# Patient Record
Sex: MALE | Race: WHITE | NOT HISPANIC OR LATINO | Employment: OTHER
[De-identification: names, ages, dates, MRNs, and addresses within clinical notes are randomized per-mention and may not be internally consistent; named-entity substitution may affect disease eponyms.]

---

## 2017-09-05 ENCOUNTER — RECORDS - HEALTHEAST (OUTPATIENT)
Dept: ADMINISTRATIVE | Facility: OTHER | Age: 57
End: 2017-09-05

## 2017-12-04 ENCOUNTER — OFFICE VISIT - HEALTHEAST (OUTPATIENT)
Dept: FAMILY MEDICINE | Facility: CLINIC | Age: 57
End: 2017-12-04

## 2017-12-04 DIAGNOSIS — K76.9 LIVER LESION, LEFT LOBE: ICD-10-CM

## 2017-12-04 DIAGNOSIS — F10.10 ALCOHOL ABUSE: ICD-10-CM

## 2017-12-04 DIAGNOSIS — B34.9 VIRAL SYNDROME: ICD-10-CM

## 2017-12-04 DIAGNOSIS — D69.6 THROMBOCYTOPENIA (H): ICD-10-CM

## 2017-12-11 ENCOUNTER — COMMUNICATION - HEALTHEAST (OUTPATIENT)
Dept: FAMILY MEDICINE | Facility: CLINIC | Age: 57
End: 2017-12-11

## 2017-12-29 ENCOUNTER — HOSPITAL ENCOUNTER (OUTPATIENT)
Dept: ULTRASOUND IMAGING | Facility: HOSPITAL | Age: 57
Discharge: HOME | End: 2017-12-29
Attending: FAMILY MEDICINE

## 2017-12-29 DIAGNOSIS — K76.89 OTHER SPECIFIED DISEASES OF LIVER: ICD-10-CM

## 2017-12-29 DIAGNOSIS — K76.9 LIVER LESION, LEFT LOBE: ICD-10-CM

## 2018-01-03 ENCOUNTER — AMBULATORY - HEALTHEAST (OUTPATIENT)
Dept: FAMILY MEDICINE | Facility: CLINIC | Age: 58
End: 2018-01-03

## 2018-01-03 DIAGNOSIS — K76.9 LIVER LESION, LEFT LOBE: ICD-10-CM

## 2018-01-10 ENCOUNTER — HOSPITAL ENCOUNTER (OUTPATIENT)
Dept: MRI IMAGING | Facility: HOSPITAL | Age: 58
Discharge: HOME | End: 2018-01-10
Attending: FAMILY MEDICINE

## 2018-01-10 ENCOUNTER — AMBULATORY - HEALTHEAST (OUTPATIENT)
Dept: FAMILY MEDICINE | Facility: CLINIC | Age: 58
End: 2018-01-10

## 2018-01-10 DIAGNOSIS — D69.6 THROMBOCYTOPENIA (H): ICD-10-CM

## 2018-01-10 DIAGNOSIS — K76.9 LIVER LESION, LEFT LOBE: ICD-10-CM

## 2018-01-10 DIAGNOSIS — K76.89 OTHER SPECIFIED DISEASES OF LIVER: ICD-10-CM

## 2018-01-10 DIAGNOSIS — K70.30 CIRRHOSIS, ALCOHOLIC (H): ICD-10-CM

## 2018-01-10 DIAGNOSIS — K76.6 PORTAL HYPERTENSION (H): ICD-10-CM

## 2018-03-26 ENCOUNTER — RECORDS - HEALTHEAST (OUTPATIENT)
Dept: ADMINISTRATIVE | Facility: OTHER | Age: 58
End: 2018-03-26

## 2018-04-25 ENCOUNTER — RECORDS - HEALTHEAST (OUTPATIENT)
Dept: ADMINISTRATIVE | Facility: OTHER | Age: 58
End: 2018-04-25

## 2018-04-30 ENCOUNTER — RECORDS - HEALTHEAST (OUTPATIENT)
Dept: ADMINISTRATIVE | Facility: OTHER | Age: 58
End: 2018-04-30

## 2018-06-07 ENCOUNTER — AMBULATORY - HEALTHEAST (OUTPATIENT)
Dept: FAMILY MEDICINE | Facility: CLINIC | Age: 58
End: 2018-06-07

## 2018-10-31 ENCOUNTER — COMMUNICATION - HEALTHEAST (OUTPATIENT)
Dept: INTERNAL MEDICINE | Facility: CLINIC | Age: 58
End: 2018-10-31

## 2018-12-05 ENCOUNTER — OFFICE VISIT - HEALTHEAST (OUTPATIENT)
Dept: FAMILY MEDICINE | Facility: CLINIC | Age: 58
End: 2018-12-05

## 2018-12-05 ENCOUNTER — COMMUNICATION - HEALTHEAST (OUTPATIENT)
Dept: FAMILY MEDICINE | Facility: CLINIC | Age: 58
End: 2018-12-05

## 2018-12-05 DIAGNOSIS — S83.207D ACUTE MENISCAL TEAR OF LEFT KNEE, SUBSEQUENT ENCOUNTER: ICD-10-CM

## 2018-12-05 DIAGNOSIS — K76.6 PORTAL HYPERTENSION (H): ICD-10-CM

## 2018-12-05 DIAGNOSIS — K70.30 ALCOHOLIC CIRRHOSIS, UNSPECIFIED WHETHER ASCITES PRESENT (H): ICD-10-CM

## 2018-12-05 DIAGNOSIS — D69.6 THROMBOCYTOPENIA (H): ICD-10-CM

## 2018-12-05 DIAGNOSIS — K76.9 LIVER LESION, LEFT LOBE: ICD-10-CM

## 2018-12-05 DIAGNOSIS — Z01.818 PREOP GENERAL PHYSICAL EXAM: ICD-10-CM

## 2018-12-05 LAB
ALBUMIN SERPL-MCNC: 3.5 G/DL (ref 3.5–5)
ALP SERPL-CCNC: 137 U/L (ref 45–120)
ALT SERPL W P-5'-P-CCNC: 53 U/L (ref 0–45)
AST SERPL W P-5'-P-CCNC: 76 U/L (ref 0–40)
BILIRUB DIRECT SERPL-MCNC: 0.9 MG/DL
BILIRUB SERPL-MCNC: 2.1 MG/DL (ref 0–1)
ERYTHROCYTE [DISTWIDTH] IN BLOOD BY AUTOMATED COUNT: 11.6 % (ref 11–14.5)
HCT VFR BLD AUTO: 47.5 % (ref 40–54)
HGB BLD-MCNC: 15.8 G/DL (ref 14–18)
INR PPP: 1.16 (ref 0.9–1.1)
MCH RBC QN AUTO: 34 PG (ref 27–34)
MCHC RBC AUTO-ENTMCNC: 33.2 G/DL (ref 32–36)
MCV RBC AUTO: 102 FL (ref 80–100)
PLATELET # BLD AUTO: 58 THOU/UL (ref 140–440)
PMV BLD AUTO: 9.5 FL (ref 7–10)
PROT SERPL-MCNC: 6.9 G/DL (ref 6–8)
RBC # BLD AUTO: 4.64 MILL/UL (ref 4.4–6.2)
WBC: 3.4 THOU/UL (ref 4–11)

## 2018-12-05 ASSESSMENT — MIFFLIN-ST. JEOR: SCORE: 1916.01

## 2018-12-06 LAB
ATRIAL RATE - MUSE: 88 BPM
DIASTOLIC BLOOD PRESSURE - MUSE: NORMAL MMHG
INTERPRETATION ECG - MUSE: NORMAL
P AXIS - MUSE: 54 DEGREES
PR INTERVAL - MUSE: 154 MS
QRS DURATION - MUSE: 80 MS
QT - MUSE: 370 MS
QTC - MUSE: 447 MS
R AXIS - MUSE: 59 DEGREES
SYSTOLIC BLOOD PRESSURE - MUSE: NORMAL MMHG
T AXIS - MUSE: 35 DEGREES
VENTRICULAR RATE- MUSE: 88 BPM

## 2018-12-07 ENCOUNTER — AMBULATORY - HEALTHEAST (OUTPATIENT)
Dept: LAB | Facility: CLINIC | Age: 58
End: 2018-12-07

## 2018-12-07 DIAGNOSIS — Z00.00 HEALTHCARE MAINTENANCE: ICD-10-CM

## 2018-12-07 LAB — PLATELET # BLD AUTO: 73 THOU/UL (ref 140–440)

## 2019-01-08 ENCOUNTER — RECORDS - HEALTHEAST (OUTPATIENT)
Dept: ADMINISTRATIVE | Facility: OTHER | Age: 59
End: 2019-01-08

## 2019-01-13 ENCOUNTER — RECORDS - HEALTHEAST (OUTPATIENT)
Dept: ADMINISTRATIVE | Facility: OTHER | Age: 59
End: 2019-01-13

## 2019-02-12 ENCOUNTER — RECORDS - HEALTHEAST (OUTPATIENT)
Dept: ADMINISTRATIVE | Facility: OTHER | Age: 59
End: 2019-02-12

## 2019-02-21 ENCOUNTER — COMMUNICATION - HEALTHEAST (OUTPATIENT)
Dept: SCHEDULING | Facility: CLINIC | Age: 59
End: 2019-02-21

## 2019-12-03 ENCOUNTER — RECORDS - HEALTHEAST (OUTPATIENT)
Dept: ADMINISTRATIVE | Facility: OTHER | Age: 59
End: 2019-12-03

## 2021-01-01 ENCOUNTER — HOSPITAL ENCOUNTER (EMERGENCY)
Facility: HOSPITAL | Age: 61
Discharge: ANOTHER HOSPITAL | End: 2021-08-23
Attending: EMERGENCY MEDICINE | Admitting: EMERGENCY MEDICINE

## 2021-01-01 ENCOUNTER — APPOINTMENT (OUTPATIENT)
Dept: RADIOLOGY | Facility: HOSPITAL | Age: 61
End: 2021-01-01
Attending: EMERGENCY MEDICINE

## 2021-01-01 ENCOUNTER — APPOINTMENT (OUTPATIENT)
Dept: CT IMAGING | Facility: HOSPITAL | Age: 61
End: 2021-01-01
Attending: EMERGENCY MEDICINE

## 2021-01-01 VITALS
RESPIRATION RATE: 26 BRPM | SYSTOLIC BLOOD PRESSURE: 107 MMHG | BODY MASS INDEX: 34.38 KG/M2 | OXYGEN SATURATION: 93 % | WEIGHT: 250 LBS | HEART RATE: 118 BPM | TEMPERATURE: 97.8 F | DIASTOLIC BLOOD PRESSURE: 55 MMHG

## 2021-01-01 VITALS — HEIGHT: 72 IN | BODY MASS INDEX: 31.83 KG/M2 | WEIGHT: 235 LBS

## 2021-01-01 VITALS — WEIGHT: 247 LBS | BODY MASS INDEX: 34.94 KG/M2

## 2021-01-01 DIAGNOSIS — R65.21 SEPTIC SHOCK (H): ICD-10-CM

## 2021-01-01 DIAGNOSIS — K70.31 ALCOHOLIC CIRRHOSIS OF LIVER WITH ASCITES (H): ICD-10-CM

## 2021-01-01 DIAGNOSIS — N28.9 RENAL INSUFFICIENCY: ICD-10-CM

## 2021-01-01 DIAGNOSIS — K72.10 END-STAGE LIVER DISEASE (H): ICD-10-CM

## 2021-01-01 DIAGNOSIS — E87.1 HYPONATREMIA: ICD-10-CM

## 2021-01-01 DIAGNOSIS — A41.9 SEPTIC SHOCK (H): ICD-10-CM

## 2021-01-01 LAB
% LINING CELLS, BODY FLUID: 4 %
ABO/RH(D): NORMAL
ABO/RH(D): NORMAL
ALBUMIN FLD-MCNC: <0.6 G/DL
ALBUMIN SERPL-MCNC: 1.6 G/DL (ref 3.5–5)
ALBUMIN SERPL-MCNC: 1.7 G/DL (ref 3.5–5)
ALP SERPL-CCNC: 96 U/L (ref 45–120)
ALT SERPL W P-5'-P-CCNC: 72 U/L (ref 0–45)
AMMONIA PLAS-SCNC: 45 UMOL/L (ref 11–35)
ANION GAP SERPL CALCULATED.3IONS-SCNC: 12 MMOL/L (ref 5–18)
ANTIBODY SCREEN: NEGATIVE
APPEARANCE FLD: CLEAR
APTT PPP: 42 SECONDS (ref 22–38)
AST SERPL W P-5'-P-CCNC: 118 U/L (ref 0–40)
ATRIAL RATE - MUSE: 116 BPM
BACTERIA BLD CULT: NO GROWTH
BACTERIA BLD CULT: NO GROWTH
BACTERIA FLD CULT: NO GROWTH
BACTERIA FLD CULT: NORMAL
BASOPHILS # BLD AUTO: 0 10E3/UL (ref 0–0.2)
BASOPHILS NFR BLD AUTO: 0 %
BILIRUB DIRECT SERPL-MCNC: 5.3 MG/DL
BILIRUB SERPL-MCNC: 11.9 MG/DL (ref 0–1)
BUN SERPL-MCNC: 30 MG/DL (ref 8–22)
CALCIUM SERPL-MCNC: 7.9 MG/DL (ref 8.5–10.5)
CHLORIDE BLD-SCNC: 92 MMOL/L (ref 98–107)
CO2 SERPL-SCNC: 19 MMOL/L (ref 22–31)
COLOR FLD: YELLOW
CREAT SERPL-MCNC: 1.54 MG/DL (ref 0.7–1.3)
DIASTOLIC BLOOD PRESSURE - MUSE: 44 MMHG
EOSINOPHIL # BLD AUTO: 0 10E3/UL (ref 0–0.7)
EOSINOPHIL NFR BLD AUTO: 0 %
ERYTHROCYTE [DISTWIDTH] IN BLOOD BY AUTOMATED COUNT: 14.5 % (ref 10–15)
ETHANOL SERPL-MCNC: <10 MG/DL
GFR SERPL CREATININE-BSD FRML MDRD: 49 ML/MIN/1.73M2
GLUCOSE BLD-MCNC: 117 MG/DL (ref 70–125)
GRAM STAIN RESULT: NORMAL
GRAM STAIN RESULT: NORMAL
HCT VFR BLD AUTO: 41.1 % (ref 40–53)
HGB BLD-MCNC: 14.6 G/DL (ref 13.3–17.7)
IMM GRANULOCYTES # BLD: 0.1 10E3/UL
IMM GRANULOCYTES NFR BLD: 1 %
INR PPP: 3.26 (ref 0.9–1.15)
INTERPRETATION ECG - MUSE: NORMAL
LACTATE SERPL-SCNC: 3.4 MMOL/L (ref 0.7–2)
LACTATE SERPL-SCNC: 5.1 MMOL/L (ref 0.7–2)
LDH FLD L TO P-CCNC: 71 U/L
LIPASE SERPL-CCNC: 25 U/L (ref 0–52)
LYMPHOCYTES # BLD AUTO: 1 10E3/UL (ref 0.8–5.3)
LYMPHOCYTES NFR BLD AUTO: 10 %
LYMPHOCYTES NFR FLD MANUAL: 12 %
MCH RBC QN AUTO: 37.6 PG (ref 26.5–33)
MCHC RBC AUTO-ENTMCNC: 35.5 G/DL (ref 31.5–36.5)
MCV RBC AUTO: 106 FL (ref 78–100)
MONOCYTES # BLD AUTO: 1.5 10E3/UL (ref 0–1.3)
MONOCYTES NFR BLD AUTO: 15 %
MONOS+MACROS NFR FLD MANUAL: 75 %
NEUTROPHILS # BLD AUTO: 7.1 10E3/UL (ref 1.6–8.3)
NEUTROPHILS NFR BLD AUTO: 74 %
NEUTS BAND NFR FLD MANUAL: 9 %
NRBC # BLD AUTO: 0 10E3/UL
NRBC BLD AUTO-RTO: 0 /100
P AXIS - MUSE: 20 DEGREES
PATH REPORT.COMMENTS IMP SPEC: NORMAL
PATH REPORT.FINAL DX SPEC: NORMAL
PATH REPORT.GROSS SPEC: NORMAL
PATH REPORT.MICROSCOPIC SPEC OTHER STN: NORMAL
PATH REPORT.RELEVANT HX SPEC: NORMAL
PLATELET # BLD AUTO: 104 10E3/UL (ref 150–450)
POTASSIUM BLD-SCNC: 4.2 MMOL/L (ref 3.5–5)
PR INTERVAL - MUSE: 140 MS
PROT FLD-MCNC: 0.9 G/DL
PROT SERPL-MCNC: 6 G/DL (ref 6–8)
QRS DURATION - MUSE: 82 MS
QT - MUSE: 350 MS
QTC - MUSE: 486 MS
R AXIS - MUSE: 25 DEGREES
RBC # BLD AUTO: 3.88 10E6/UL (ref 4.4–5.9)
RBC # FLD: 1000 /UL
SARS-COV-2 RNA RESP QL NAA+PROBE: NEGATIVE
SODIUM SERPL-SCNC: 123 MMOL/L (ref 136–145)
SPECIMEN EXPIRATION DATE: NORMAL
SPECIMEN EXPIRATION DATE: NORMAL
SYSTOLIC BLOOD PRESSURE - MUSE: 69 MMHG
T AXIS - MUSE: 34 DEGREES
TROPONIN I SERPL-MCNC: 0.04 NG/ML (ref 0–0.29)
VENTRICULAR RATE- MUSE: 116 BPM
WBC # BLD AUTO: 9.7 10E3/UL (ref 4–11)
WBC # FLD AUTO: 205 /UL

## 2021-01-01 PROCEDURE — 83690 ASSAY OF LIPASE: CPT | Performed by: EMERGENCY MEDICINE

## 2021-01-01 PROCEDURE — 250N000011 HC RX IP 250 OP 636: Performed by: EMERGENCY MEDICINE

## 2021-01-01 PROCEDURE — 83605 ASSAY OF LACTIC ACID: CPT | Performed by: EMERGENCY MEDICINE

## 2021-01-01 PROCEDURE — 84484 ASSAY OF TROPONIN QUANT: CPT | Performed by: EMERGENCY MEDICINE

## 2021-01-01 PROCEDURE — 80048 BASIC METABOLIC PNL TOTAL CA: CPT | Performed by: EMERGENCY MEDICINE

## 2021-01-01 PROCEDURE — 89050 BODY FLUID CELL COUNT: CPT | Performed by: EMERGENCY MEDICINE

## 2021-01-01 PROCEDURE — 250N000009 HC RX 250: Performed by: EMERGENCY MEDICINE

## 2021-01-01 PROCEDURE — 87205 SMEAR GRAM STAIN: CPT | Performed by: EMERGENCY MEDICINE

## 2021-01-01 PROCEDURE — 99291 CRITICAL CARE FIRST HOUR: CPT | Mod: 25

## 2021-01-01 PROCEDURE — 82140 ASSAY OF AMMONIA: CPT | Performed by: EMERGENCY MEDICINE

## 2021-01-01 PROCEDURE — 82077 ASSAY SPEC XCP UR&BREATH IA: CPT | Performed by: EMERGENCY MEDICINE

## 2021-01-01 PROCEDURE — 96375 TX/PRO/DX INJ NEW DRUG ADDON: CPT

## 2021-01-01 PROCEDURE — 87635 SARS-COV-2 COVID-19 AMP PRB: CPT | Performed by: EMERGENCY MEDICINE

## 2021-01-01 PROCEDURE — 87075 CULTR BACTERIA EXCEPT BLOOD: CPT | Performed by: EMERGENCY MEDICINE

## 2021-01-01 PROCEDURE — C9113 INJ PANTOPRAZOLE SODIUM, VIA: HCPCS | Performed by: EMERGENCY MEDICINE

## 2021-01-01 PROCEDURE — 96368 THER/DIAG CONCURRENT INF: CPT

## 2021-01-01 PROCEDURE — 96365 THER/PROPH/DIAG IV INF INIT: CPT | Mod: 59

## 2021-01-01 PROCEDURE — 82042 OTHER SOURCE ALBUMIN QUAN EA: CPT | Performed by: EMERGENCY MEDICINE

## 2021-01-01 PROCEDURE — 93005 ELECTROCARDIOGRAM TRACING: CPT

## 2021-01-01 PROCEDURE — 71045 X-RAY EXAM CHEST 1 VIEW: CPT

## 2021-01-01 PROCEDURE — 258N000003 HC RX IP 258 OP 636: Performed by: EMERGENCY MEDICINE

## 2021-01-01 PROCEDURE — 82248 BILIRUBIN DIRECT: CPT | Performed by: EMERGENCY MEDICINE

## 2021-01-01 PROCEDURE — 36415 COLL VENOUS BLD VENIPUNCTURE: CPT | Performed by: EMERGENCY MEDICINE

## 2021-01-01 PROCEDURE — 93005 ELECTROCARDIOGRAM TRACING: CPT | Performed by: EMERGENCY MEDICINE

## 2021-01-01 PROCEDURE — 87040 BLOOD CULTURE FOR BACTERIA: CPT | Performed by: EMERGENCY MEDICINE

## 2021-01-01 PROCEDURE — 87070 CULTURE OTHR SPECIMN AEROBIC: CPT | Performed by: EMERGENCY MEDICINE

## 2021-01-01 PROCEDURE — 83615 LACTATE (LD) (LDH) ENZYME: CPT | Performed by: EMERGENCY MEDICINE

## 2021-01-01 PROCEDURE — 88112 CYTOPATH CELL ENHANCE TECH: CPT | Mod: TC | Performed by: EMERGENCY MEDICINE

## 2021-01-01 PROCEDURE — 85610 PROTHROMBIN TIME: CPT | Performed by: EMERGENCY MEDICINE

## 2021-01-01 PROCEDURE — 86901 BLOOD TYPING SEROLOGIC RH(D): CPT | Performed by: EMERGENCY MEDICINE

## 2021-01-01 PROCEDURE — C9803 HOPD COVID-19 SPEC COLLECT: HCPCS

## 2021-01-01 PROCEDURE — 88112 CYTOPATH CELL ENHANCE TECH: CPT | Mod: 26 | Performed by: PATHOLOGY

## 2021-01-01 PROCEDURE — 85025 COMPLETE CBC W/AUTO DIFF WBC: CPT | Performed by: EMERGENCY MEDICINE

## 2021-01-01 PROCEDURE — 84157 ASSAY OF PROTEIN OTHER: CPT | Performed by: EMERGENCY MEDICINE

## 2021-01-01 PROCEDURE — 86900 BLOOD TYPING SEROLOGIC ABO: CPT | Performed by: EMERGENCY MEDICINE

## 2021-01-01 PROCEDURE — 96366 THER/PROPH/DIAG IV INF ADDON: CPT

## 2021-01-01 PROCEDURE — 74177 CT ABD & PELVIS W/CONTRAST: CPT

## 2021-01-01 PROCEDURE — 89051 BODY FLUID CELL COUNT: CPT | Performed by: EMERGENCY MEDICINE

## 2021-01-01 PROCEDURE — P9047 ALBUMIN (HUMAN), 25%, 50ML: HCPCS | Performed by: EMERGENCY MEDICINE

## 2021-01-01 PROCEDURE — 85730 THROMBOPLASTIN TIME PARTIAL: CPT | Performed by: EMERGENCY MEDICINE

## 2021-01-01 PROCEDURE — 36569 INSJ PICC 5 YR+ W/O IMAGING: CPT

## 2021-01-01 PROCEDURE — 96361 HYDRATE IV INFUSION ADD-ON: CPT

## 2021-01-01 RX ORDER — IOPAMIDOL 755 MG/ML
75 INJECTION, SOLUTION INTRAVASCULAR ONCE
Status: COMPLETED | OUTPATIENT
Start: 2021-01-01 | End: 2021-01-01

## 2021-01-01 RX ORDER — CEFTRIAXONE 1 G/1
1 INJECTION, POWDER, FOR SOLUTION INTRAMUSCULAR; INTRAVENOUS ONCE
Status: COMPLETED | OUTPATIENT
Start: 2021-01-01 | End: 2021-01-01

## 2021-01-01 RX ORDER — NOREPINEPHRINE BITARTRATE 0.02 MG/ML
.01-.6 INJECTION, SOLUTION INTRAVENOUS CONTINUOUS
Status: DISCONTINUED | OUTPATIENT
Start: 2021-01-01 | End: 2021-01-01 | Stop reason: HOSPADM

## 2021-01-01 RX ORDER — CEFTRIAXONE 1 G/1
1 INJECTION, POWDER, FOR SOLUTION INTRAMUSCULAR; INTRAVENOUS ONCE
Status: DISCONTINUED | OUTPATIENT
Start: 2021-01-01 | End: 2021-01-01

## 2021-01-01 RX ORDER — ALBUMIN (HUMAN) 12.5 G/50ML
12.5 SOLUTION INTRAVENOUS ONCE
Status: COMPLETED | OUTPATIENT
Start: 2021-01-01 | End: 2021-01-01

## 2021-01-01 RX ADMIN — PANTOPRAZOLE SODIUM 80 MG: 40 INJECTION, POWDER, FOR SOLUTION INTRAVENOUS at 18:13

## 2021-01-01 RX ADMIN — IOPAMIDOL 75 ML: 755 INJECTION, SOLUTION INTRAVENOUS at 19:16

## 2021-01-01 RX ADMIN — ALBUMIN HUMAN 12.5 G: 0.25 SOLUTION INTRAVENOUS at 18:13

## 2021-01-01 RX ADMIN — Medication 0.03 MCG/KG/MIN: at 17:07

## 2021-01-01 RX ADMIN — SODIUM CHLORIDE 250 ML: 9 INJECTION, SOLUTION INTRAVENOUS at 16:03

## 2021-01-01 RX ADMIN — CEFTRIAXONE SODIUM 1 G: 1 INJECTION, POWDER, FOR SOLUTION INTRAMUSCULAR; INTRAVENOUS at 19:23

## 2021-06-14 NOTE — PROGRESS NOTES
Subjective: This patient comes in for evaluation is a 57-year-old male patient comes in for symptoms of muscle aches headache nausea chills he has been out of work since 11/29/2017 symptoms start 11/28/2017.    She had some clear drainage cough    He started to feel better.    Patient previously had abnormal left lobe liver lesion 1.6 x 1.1 cm this was to get a follow-up ultrasound.  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  I did recheck CBC as he had thrombocytopenia.  Will get back to him regarding that and the ultrasound of the liver.    He had been seeing hematology.    He has not seen any doctor since I saw him last back in summer 2016    He states he is drinking alcohol less now down to 12 cans per week of beer        Tobacco status: He  reports that he quit smoking about 4 years ago. His smoking use included Cigars. He has never used smokeless tobacco.    Patient Active Problem List    Diagnosis Date Noted     Liver lesion, right lobe 04/27/2016     Alcoholic liver disease 04/18/2016     Acute meniscal tear of right knee 03/25/2016     Thrombocytopenia 03/25/2016     Anterior Wall Chest Pain With Respiration      Contusion Of The Right Chest Wall With Intact Skin Surface        No current outpatient prescriptions on file.     No current facility-administered medications for this visit.        ROS: 10 point review of systems negative other than as outlined above    Objective:    /76 (Patient Site: Right Arm, Patient Position: Sitting, Cuff Size: Adult Large)  Pulse 96  Temp 97.3  F (36.3  C) (Oral)   Resp 16  Wt (!) 247 lb (112 kg)  BMI 34.94 kg/m2  Body mass index is 34.94 kg/(m^2).      General appearance no acute distress    Vital signs are stable afebrile nose was clear canals and TMs normal oropharynx was clear    Lungs clear no rales or rhonchi heart regular S1-S2    Abdomen soft  nontender no masses no organomegaly    No scleral icterus, no jaundice change to the skin.    MCV was elevated at 102 hemoglobin normal 15.6 platelets are low at 60,000 normal white count at 5100 with normal differential    Ultrasound liver pending    Results for orders placed or performed in visit on 12/04/17   HM1 (CBC with Diff)   Result Value Ref Range    WBC 5.1 4.0 - 11.0 thou/uL    RBC 4.57 4.40 - 6.20 mill/uL    Hemoglobin 15.6 14.0 - 18.0 g/dL    Hematocrit 46.7 40.0 - 54.0 %     (H) 80 - 100 fL    MCH 34.1 (H) 27.0 - 34.0 pg    MCHC 33.5 32.0 - 36.0 g/dL    RDW 9.8 (L) 11.0 - 14.5 %    Platelets 60 (L) 140 - 440 thou/uL    MPV 8.3 7.0 - 10.0 fL    Neutrophils % 45 (L) 50 - 70 %    Lymphocytes % 38 20 - 40 %    Monocytes % 15 (H) 2 - 10 %    Eosinophils % 1 0 - 6 %    Basophils % 1 0 - 2 %    Neutrophils Absolute 2.3 2.0 - 7.7 thou/uL    Lymphocytes Absolute 1.9 0.8 - 4.4 thou/uL    Monocytes Absolute 0.8 0.0 - 0.9 thou/uL    Eosinophils Absolute 0.1 0.0 - 0.4 thou/uL    Basophils Absolute 0.1 0.0 - 0.2 thou/uL       Assessment:  1. Viral syndrome  HM1(CBC and Differential)    HM1 (CBC with Diff)   2. Thrombocytopenia  HM1(CBC and Differential)    HM1 (CBC with Diff)   3. Alcohol abuse     4. Liver lesion, left lobe  US Abdomen Limited     Viral syndrome improving okay to return to work tomorrow    Problem cytopenia worsened some with platelets at 60,000 will discuss seeing hematology again    Liver lesion will await ultrasound    History of alcohol abuse although drinking less alcohol now by history    Plan: As outlined above, symptomatic treatment for viral syndrome    This transcription uses voice recognition software, which may contain typographical errors.

## 2021-06-15 NOTE — PROGRESS NOTES
Patient's MRI of his liver came back showing evidence of cirrhosis and portal hypertension.  There are 2 lesions 1.4 cm and 0.7 cm in the lateral left hepatic lobe.  These represent an LR-3 observation meaning intermediate probability for hepatocellular carcinoma.    I contacted the patient regarding results.  He also has the thrombocytopenia as outlined previous    He has decreased his alcohol but still drinks about a 12 pack per week he admitted when I saw him in December    I have not seen him for a year and a half prior to that.    I am referring the patient to [facility] for further evaluation and treatment regarding this question need for liver biopsy    Patient understands that and understands again the recommendation to quit alcohol altogether.    We will contact him at his work phone at [phone] tomorrow

## 2021-06-22 NOTE — PROGRESS NOTES
Preoperative Exam    [id number] Procedure: Left knee scope  Surgery Date:  12/07/2018  Surgery Location: [facility], fax [phone]    Surgeon:  Dr. Lovell    Assessment/Plan:     1. Preop general physical exam  Electrocardiogram Perform - Clinic    HM2(CBC w/o Differential)    INR   2. Portal hypertension (H)  Ambulatory referral to Gastroenterology   3. Alcoholic cirrhosis, unspecified whether ascites present (H)  Ambulatory referral to Gastroenterology    Hepatic Profile   4. Liver lesion, left lobe  Ambulatory referral to Gastroenterology   5. Acute meniscal tear of left knee, subsequent encounter     6. Thrombocytopenia (H)  HM2(CBC w/o Differential)         Surgical Procedure Risk: Low (reported cardiac risk generally < 1%)  Have you had prior anesthesia?: No  Have you or any family members had a previous anesthesia reaction:  No  Do you or any family members have a history of a clotting or bleeding disorder?: No  Cardiac Risk Assessment: no increased risk for major cardiac complications    Patient approved for surgery with general or local anesthesia.        Functional Status: Independent  Patient plans to recover at home with family.     Subjective:      Wally Eagle is a 58 y.o. male who presents for a preoperative consultation.  History of left knee pain with effusion has fraying of meniscus.  I do not have any imaging or orthopedic notes.  Only the history from the patient    Patient has a history of alcoholic cirrhosis with portal hypertension and low platelets.  He is followed by Gurdeep GARCIA    He also saw a hematologist regarding his low platelets prior to his surgery for right knee meniscus arthroscopy.  He had platelets at 60,000 at that time and was authorized for surgery.    His platelets today were 58,000.  He has had no bleeding history.    He does need to follow-up again with Gurdeep GARCIA to follow-up on some liver densities.  Will need follow-up labs  "and imaging.  He was referred for this.  He understands the importance of getting this follow-up done.  He failed to follow-up last spring and did not get the additional lab tests that they wanted to get in March 2018.    10 point review of systems reviewed and are negative, other than those listed in the HPI.    Pertinent History  Do you have difficulty breathing or chest pain after walking up a flight of stairs: No  History of obstructive sleep apnea: No  Steroid use in the last 6 months: No  Frequent Aspirin/NSAID use: Yes: Uses ibuprofen PRN, but \"not very often\"  Prior Blood Transfusion: No  Prior Blood Transfusion Reaction: No  If for some reason prior to, during or after the procedure, if it is medically indicated, would you be willing to have a blood transfusion?:  There is no transfusion refusal.    Current Outpatient Medications   Medication Sig Dispense Refill     ibuprofen (ADVIL,MOTRIN) 600 MG tablet Take 1 tablet (600 mg total) by mouth every 6 (six) hours as needed for pain. 30 tablet 0     No current facility-administered medications for this visit.         No Known Allergies    Patient Active Problem List   Diagnosis     Contusion Of The Right Chest Wall With Intact Skin Surface     Anterior Wall Chest Pain With Respiration     Acute meniscal tear of right knee     Thrombocytopenia (H)     Alcoholic liver disease (H)     Liver lesion, right lobe     Portal hypertension (H)     Cirrhosis, alcoholic (H)     Liver lesion, left lobe       Past Medical History:   Diagnosis Date     Alcoholic liver disease (H)      Portal hypertension (H)      Thrombocytopenia (H)        Past Surgical History:   Procedure Laterality Date     APPENDECTOMY  1970   Right knee meniscal tear, arthroscopy, 2016    No family or personal history of any bleeding or anesthesia problems.    Social History     Socioeconomic History     Marital status: Single     Spouse name: Not on file     Number of children: 1     Years of " "education: Not on file     Highest education level: Not on file   Social Needs     Financial resource strain: Not on file     Food insecurity - worry: Not on file     Food insecurity - inability: Not on file     Transportation needs - medical: Not on file     Transportation needs - non-medical: Not on file   Occupational History     Occupation:    Tobacco Use     Smoking status: Former Smoker     Types: Cigars     Last attempt to quit: 3/25/2013     Years since quittin.7     Smokeless tobacco: Never Used     Tobacco comment: has an occasional cigarrette   Substance and Sexual Activity     Alcohol use: Yes     Alcohol/week: 14.4 oz     Types: 24 Cans of beer per week     Drug use: No     Sexual activity: No   Other Topics Concern     Not on file   Social History Narrative    Single;     Has one daughter, grandson             Objective:     Vitals:    18 0824   BP: 92/66   Pulse: (!) 104   Resp: 16   Temp: 97.9  F (36.6  C)   TempSrc: Oral   SpO2: 96%   Weight: (!) 235 lb (106.6 kg)   Height: 5' 11.5\" (1.816 m)         Physical Exam:  General appearance no acute distress    HEENT oropharynx is clear nose is clear canals and TMs normal    Lungs are clear throughout no rales or rhonchi heart regular S1-S2    Abdomen is soft nontender no guarding or rebound no ascites or abdominal masses.    Extremities without edema skin was normal.    Left knee with effusion tenderness both medial and lateral joint line.    Skin was normal no rashes  There are no Patient Instructions on file for this visit.      Labs: Liver function tests are pending, INR is pending.  These will be faxed.    Platelets at 58,000 and are stable from last check.,  60,000 back in 2016.  Recent Results (from the past 24 hour(s))   HM2(CBC w/o Differential)    Collection Time: 18  9:02 AM   Result Value Ref Range    WBC 3.4 (L) 4.0 - 11.0 thou/uL    RBC 4.64 4.40 - 6.20 mill/uL    Hemoglobin 15.8 14.0 - 18.0 g/dL    Hematocrit 47.5 " 40.0 - 54.0 %     (H) 80 - 100 fL    MCH 34.0 27.0 - 34.0 pg    MCHC 33.2 32.0 - 36.0 g/dL    RDW 11.6 11.0 - 14.5 %    Platelets 58 (L) 140 - 440 thou/uL    MPV 9.5 7.0 - 10.0 fL   Electrocardiogram Perform - Clinic    Collection Time: 12/05/18  9:35 AM   Result Value Ref Range    SYSTOLIC BLOOD PRESSURE  mmHg    DIASTOLIC BLOOD PRESSURE  mmHg    VENTRICULAR RATE 88 BPM    ATRIAL RATE 88 BPM    P-R INTERVAL 154 ms    QRS DURATION 80 ms    Q-T INTERVAL 370 ms    QTC CALCULATION (BEZET) 447 ms    P Axis 54 degrees    R AXIS 59 degrees    T AXIS 35 degrees    MUSE DIAGNOSIS       Normal sinus rhythm  Normal ECG  When compared with ECG of 10-MAR-2013 15:49,  No significant change was found           There is no immunization history on file for this patient.        Electronically signed by Eric Villatoro MD 12/05/18 8:28 AM

## 2021-08-22 PROBLEM — K70.30 CIRRHOSIS, ALCOHOLIC (H): Status: ACTIVE | Noted: 2018-01-10

## 2021-08-22 PROBLEM — K76.6 PORTAL HYPERTENSION (H): Status: ACTIVE | Noted: 2018-01-10

## 2021-08-22 NOTE — ED PROVIDER NOTES
"  Emergency Department Encounter     Evaluation Date & Time:   8/22/2021  3:41 PM    CHIEF COMPLAINT:  Hypotension      Triage Note: Pt with history of cirrhosis.  Pt with large distended abdomen.  Pt with increased n/v/d.  Pt a/o x3 but per neighbor pt with increased failure to thrive.  Pt SBP in the 60's. Md called to room for evaluation        Impression and Plan       FINAL IMPRESSION:    ICD-10-CM    1. Septic shock (H)  A41.9     R65.21    2. End-stage liver disease (H)  K72.90    3. Alcoholic cirrhosis of liver with ascites (H)  K70.31    4. Hyponatremia  E87.1    5. Renal insufficiency  N28.9          ED COURSE & MEDICAL DECISION MAKING:    3:46PM I met with the patient for the initial interview and physical examination. Discussed plan for treatment and workup in the ED. PPE: Provider wore gloves, goggles, surgical cap, N95 mask, and surgical mask.   4:14 PM I discussed the case with Dr. Bruce from [facility].   4:30 PM I spoke with Dr. Mccarty, intensivist for recommendations.  5:41PM I attempted paracentesis but was unsuccessful. Discussed with a colleague and she will try.   6:15 PM Dr. Wolfe attempted paracentesis.   8:38 PM I spoke with Dr. Lilly, intensivist at [facility] who agrees to admit the patient.     61 year old male, history of alcoholic cirrhosis with ascites and portal hypertension, who presents from home for evaluation of hypotension. His neighbor called medics for concerns that he was not caring for himself. On their arrival, medics recorded BP of 58/40, gave 200 mLs fluid, and SBP improved to 60s.     Patient reports abdominal discomfort for the past couple of weeks, which he describes as feeling \"like I ate too much\" with associated abdominal distention. He reports nausea with dry heaves as well as diarrhea. No hematemesis or melena, but he is unsure if he has had hematochezia. No fevers. Reports some shortness of breath; no chest pain or cough. Last used alcohol one month ago.    On exam, " patient is ill-appearing and ashen with BL scleral icterus. Abdomen is very distended, but soft and non-tender to palpation.    Patient placed on cardiac monitor, IV access established and blood sent for labs.    Patient's initial blood pressure was in the 60s systolic for which he was given 250 cc IVF bolus.    PICC line ordered for administration of vasopressors; levophed started in peripheral IV (16 gauge) pending placement of PICC.      I spoke with GI and they recommended pan-culture, including ascitic fluid and antibiotics (IV Ceftriaxone), as well as cytology on the ascitic fluid. They also recommended ICU admission.    I spoke with Dr. Rodríguez, Intensivist, who agreed with administration of small dose of IV albumin, pressors with goal SBP >90 and ICU admission.    PICC line placed and after confirmation of placement, levophed switched from peripheral IV to PICC line.    I was unsuccessful at obtaining ascitic fluid under ultrasound-guidance, possibly secondary to IV being too short. Dr. Wolfe was able to obtain 20cc ascitic fluid and studies, including cytology, were ordered. Patient then given IV Ceftriaxone.    Labs remarkable for no leukocytosis (WBC 9.7) or anemia (Hb 14.4) with thrombocytopenia (plt 104). He has hyponatremia (Na+ 123) - likely secondary to hypervolemia.  He has renal insufficiency (creatinine 1.54 with GFR 49 - baseline renal function unknown) - consider hepatorenal syndrome.  Hepatic panel remarkable for elevated bilirubin (total 11.9, direct 5.3) with elevated enzymes (, ALT 72).  He is coagulopathic with INR of 3.26.    Lactate returned elevated to 5.1; consider combination of sepsis, dehydration and poor hepatic clearance.  Blood cultures, urine culture and ascitic fluid culture obtained and patient given IV ceftriaxone.    Given severe liver disease with third spacing and pleural effusions with concern for pulmonary edema, patient was not given 30 ml/kg IVF bolus.    CT  "abdomen / pelvis performed and demonstrated cirrhosis with no focal hepatic mass; moderate diffuse ascites and gallstones.     Covid test negative.    There are no ICU beds available at [facility] or within the [facility] system.  We were able to obtain an ICU bed at [facility]; Dr. Lilly accepted the transfer.  EMTALA forms were signed.  Patient in improved condition at time of disposition.    RN updated family by phone.    At the conclusion of the encounter I discussed the results of all the tests and the disposition. The questions were answered. The patient acknowledged understanding and were agreeable with the care plan.    45 minutes of critical care time    Reassessments & Consults       MEDICATIONS GIVEN IN THE EMERGENCY DEPARTMENT:  Medications   0.9% sodium chloride BOLUS (0 mLs Intravenous Stopped 8/22/21 1712)   pantoprazole (PROTONIX) IV push injection 80 mg (80 mg Intravenous Given 8/22/21 1813)   albumin human 25 % injection 12.5 g (0 g Intravenous Stopped 8/22/21 1825)   cefTRIAXone (ROCEPHIN) 1 g vial to attach to  mL bag for ADULTS or NS 50 mL bag for PEDS (0 g Intravenous Stopped 8/22/21 1938)   iopamidol (ISOVUE-370) solution 75 mL (75 mLs Intravenous Given 8/22/21 1916)       NEW PRESCRIPTIONS STARTED AT TODAY'S ED VISIT:  Discharge Medication List as of 8/23/2021 12:08 AM          HPI     HPI     Wally Eagle is a 61 year old male with a pertinent history of alcoholic cirrhosis, portal hypertension and thrombocytopenia, who presents to this ED via EMS from home for evaluation of hypotension.     Per EMS, a neighbor called medics for concerns of failure to thrive. On arrival, medics recorded BP of 58/40, gave 200 mLs fluid, and SBP improved to 60s.     Patient reports that his stomach has been upset for the past couple of weeks but is unable to explain if it resembles abdominal pain or nausea, adding that it feels \"like I ate too much.\" He reports associated distention; denies previous " paracentesis. Reports ongoing nausea and vomiting, with 3 episodes today, although has been mostly dry heaving. No hematemesis. Also reports diarrhea but unable to estimate how many episodes and unsure if it contained any blood, but he denies melena.     Endorses shortness of breath but denies chest pain, cough, fevers.     He was seeing a provider at [facility] for liver problems but has not seen them in ~3 years. Has reportedly been sober for ~1 months because alcohol has not tasted good anymore.       REVIEW OF SYSTEMS:  All other systems reviewed and are negative.      Medical History     History reviewed. No pertinent past medical history.    Past Surgical History:   Procedure Laterality Date     APPENDECTOMY       PICC TRIPLE LUMEN PLACEMENT  2021            Family History   Problem Relation Age of Onset     Osteoarthritis Mother         bilateral knee replacement     Coronary Artery Disease Father         CABGx4     Cerebrovascular Disease Father      Osteoarthritis Father         knee replacement     Prostate Cancer Brother      Cancer Brother      No Known Problems Sister      No Known Problems Daughter        Social History     Tobacco Use     Smoking status: Former Smoker     Types: Cigars     Quit date: 3/25/2013     Years since quittin.4     Smokeless tobacco: Never Used     Tobacco comment: has an occasional cigarrette   Substance Use Topics     Alcohol use: Not Currently     Alcohol/week: 24.0 standard drinks     Comment: 23: sober for ~2 months     Drug use: No       ibuprofen (ADVIL,MOTRIN) 600 MG tablet        Physical Exam     First Vitals:  Patient Vitals for the past 24 hrs:   BP Temp Temp src Pulse Resp SpO2 Weight   21 2320 107/55 -- -- 118 26 93 % --   21 2310 122/88 -- -- 119 28 94 % --   21 2300 95/57 -- -- 109 20 96 % --   21 2250 115/66 -- -- 109 19 96 % --   21 2240 102/62 -- -- 105 20 95 % --   21 2230 112/67 -- -- 107 22 96 %  --   08/22/21 2220 111/69 -- -- 105 20 96 % --   08/22/21 2210 109/66 -- -- 107 21 96 % --   08/22/21 2200 112/73 -- -- 106 21 96 % --   08/22/21 2150 108/69 -- -- 108 22 95 % --   08/22/21 2140 112/64 -- -- 109 (!) 35 94 % --   08/22/21 2130 93/63 -- -- 107 19 94 % --   08/22/21 2120 112/72 -- -- 108 19 96 % --   08/22/21 2100 99/70 -- -- 108 20 95 % --   08/22/21 2050 120/75 -- -- 110 23 96 % --   08/22/21 2040 117/69 -- -- 108 27 98 % --   08/22/21 2030 100/68 -- -- 105 20 98 % --   08/22/21 2020 101/63 -- -- 107 (!) 37 96 % --   08/22/21 2010 100/70 -- -- 107 29 97 % --   08/22/21 2000 92/66 -- -- 105 (!) 39 98 % --   08/22/21 1950 94/63 -- -- 104 21 98 % --   08/22/21 1940 105/61 -- -- 104 21 99 % --   08/22/21 1930 117/71 -- -- 104 27 100 % --   08/22/21 1920 110/71 -- -- 105 21 100 % --   08/22/21 1910 108/67 -- -- 109 (!) 31 99 % --   08/22/21 1900 98/66 -- -- 110 22 98 % --   08/22/21 1845 94/61 -- -- 109 23 100 % --   08/22/21 1840 100/67 -- -- 109 21 99 % --   08/22/21 1830 95/70 -- -- 110 24 98 % --   08/22/21 1820 (!) 87/65 -- -- 109 25 99 % --   08/22/21 1815 90/57 -- -- 109 29 99 % --   08/22/21 1800 122/56 -- -- 108 -- 100 % --   08/22/21 1745 (!) 89/57 -- -- 110 -- 99 % --   08/22/21 1740 (!) 86/59 -- -- 108 -- 97 % --   08/22/21 1730 (!) 84/55 -- -- 108 -- 98 % --   08/22/21 1715 (!) 78/51 -- -- 108 -- 98 % --   08/22/21 1700 (!) 80/52 -- -- 107 -- 97 % --   08/22/21 1650 (!) 82/53 -- -- 111 16 93 % --   08/22/21 1645 (!) 62/31 -- -- 111 (!) 32 93 % --   08/22/21 1620 (!) 59/28 -- -- 113 (!) 34 94 % --   08/22/21 1615 (!) 56/39 -- -- 110 29 94 % --   08/22/21 1610 -- -- -- 109 27 93 % --   08/22/21 1605 (!) 63/36 -- -- 111 28 93 % --   08/22/21 1600 (!) 51/26 -- -- 112 (!) 31 93 % --   08/22/21 1555 (!) 58/40 -- -- 114 (!) 34 92 % --   08/22/21 1550 -- -- -- 114 -- 94 % --   08/22/21 1549 (!) 69/44 97.8  F (36.6  C) Temporal 115 18 94 % 113.4 kg (250 lb)   08/22/21 1545 (!) 69/44 -- -- 116 -- 93 %  --       PHYSICAL EXAM:   Physical Exam    GENERAL: Awake, alert.  Ill-appearing and ashen.   HEENT: Normocephalic, atraumatic. Pupils equal, round and reactive. Scleral icterus, BL.   NECK: No stridor.  PULMONARY: No respiratory distress.  Diminished breath sounds at the bases (R > L); lungs otherwise clear without wheezes rhonchi or rales.  CARDIO: Tachycardic rate with regular rhythm.  No significant murmur, rub or gallop.    ABDOMINAL: Abdomen soft, very distended and non-tender to palpation.   EXTREMITIES: Bipedal edema.       NEURO: Alert and oriented to person, place and time.  Cranial nerves grossly intact.  No focal motor deficit.  PSYCH: Normal mood and affect.  SKIN: Ashen in appearance.    Results     LAB:  All pertinent labs reviewed and interpreted  Labs Ordered and Resulted from Time of ED Arrival Up to the Time of Departure from the ED   BASIC METABOLIC PANEL - Abnormal; Notable for the following components:       Result Value    Sodium 123 (*)     Chloride 92 (*)     Carbon Dioxide (CO2) 19 (*)     Urea Nitrogen 30 (*)     Creatinine 1.54 (*)     Calcium 7.9 (*)     GFR Estimate 49 (*)     All other components within normal limits   HEPATIC FUNCTION PANEL - Abnormal; Notable for the following components:    Bilirubin Total 11.9 (*)     Bilirubin Direct 5.3 (*)     Albumin 1.6 (*)      (*)     ALT 72 (*)     All other components within normal limits   LACTIC ACID WHOLE BLOOD - Abnormal; Notable for the following components:    Lactic Acid 5.1 (*)     All other components within normal limits   CBC WITH PLATELETS AND DIFFERENTIAL - Abnormal; Notable for the following components:    RBC Count 3.88 (*)      (*)     MCH 37.6 (*)     Platelet Count 104 (*)     Absolute Monocytes 1.5 (*)     Absolute Immature Granulocytes 0.1 (*)     All other components within normal limits   INR - Abnormal; Notable for the following components:    INR 3.26 (*)     All other components within normal limits    PARTIAL THROMBOPLASTIN TIME - Abnormal; Notable for the following components:    aPTT 42 (*)     All other components within normal limits   AMMONIA - Abnormal; Notable for the following components:    Ammonia 45 (*)     All other components within normal limits   ALBUMIN LEVEL - Abnormal; Notable for the following components:    Albumin 1.7 (*)     All other components within normal limits   CELL COUNT BODY FLUID - Abnormal; Notable for the following components:    Color Yellow (*)     All other components within normal limits    Narrative:     No reference ranges have been established.  This result  should be interpreted in the context of the patient's clinical condition and   compared to simultaneous measurement in the patient's blood.         LACTIC ACID WHOLE BLOOD - Abnormal; Notable for the following components:    Lactic Acid 3.4 (*)     All other components within normal limits   LIPASE - Normal   TROPONIN I - Normal   ETHYL ALCOHOL LEVEL - Normal   COVID-19 VIRUS (CORONAVIRUS) BY PCR - Normal    Narrative:     Testing was performed using the [employer]becca  SARS-CoV-2 & Influenza A/B Assay on the [employer]becca  Liza  System.  This test should be ordered for the detection of SARS-COV-2 in individuals who meet SARS-CoV-2 clinical and/or epidemiological criteria. Test performance is unknown in asymptomatic patients.  This test is for in vitro diagnostic use under the FDA EUA for laboratories certified under CLIA to perform moderate and/or high complexity testing. This test has not been FDA cleared or approved.  A negative test does not rule out the presence of PCR inhibitors in the specimen or target RNA in concentration below the limit of detection for the assay. The possibility of a false negative should be considered if the patient's recent exposure or clinical presentation suggests COVID-19.  [facility] are certified under the Clinical Laboratory Improvement Amendments of 1988 (CLIA-88) as qualified  to perform moderate and/or high complexity laboratory testing.   CBC WITH PLATELETS & DIFFERENTIAL    Narrative:     The following orders were created for panel order CBC with platelets + differential.  Procedure                               Abnormality         Status                     ---------                               -----------         ------                     CBC with platelets and d...[[id number]]  Abnormal            Final result                 Please view results for these tests on the individual orders.   PROTEIN FLUID    Narrative:     No reference ranges have been established.  This result should be interpreted in the context of the patient's clinical condition and compared to simultaneous measurement in the patient's blood.    This test was developed and its performance characteristics validated by [employer].   The US Food and Drug Adminstration has not approved or cleared this test; FDA clearance is not required for clinical use.   The results are not intended to be the sole means for clinical diagnosis or patient management decisions.  No reference ranges have been established.  This result should be interpreted in the context of the patient's clinical condition and compared to simultaneous measurement in the patient's blood.   ALBUMIN FLUID    Narrative:     No reference ranges have been established.  This result should be interpreted in the context of the patient's clinical condition and compared to simultaneous measurement in the patient's blood.    This test was developed and its performance characteristics validated by [employer].   The US Food and Drug Adminstration has not approved or cleared this test; FDA clearance is not required for clinical use.   The results are not intended to be the sole means for clinical diagnosis or patient management decisions.  No reference ranges have been established.  This result should be interpreted in the  context of the patient's clinical condition and compared to simultaneous measurement in the patient's blood.   LACTATE DEHYDROGENASE FLUID    Narrative:     No reference ranges have been established.  This result should be interpreted in the context of the patient's clinical condition and compared to simultaneous measurement in the patient's blood.   DIFERENTIAL BODY FLUID    Narrative:     No reference ranges have been established.  This result   should be interpreted in the context of the patient's clinical condition and   compared to simultaneous measurement in the patient's blood.   CARDIAC CONTINUOUS MONITORING   MAY SALINE LOCK IV   CALL   TYPE AND SCREEN, ADULT   ABO AND RH   NON-GYNECOLOGIC CYTOLOGY   GRAM STAIN   BLOOD CULTURE   BLOOD CULTURE   ANAEROBIC BACTERIAL CULTURE ROUTINE   AEROBIC BACTERIAL CULTURE ROUTINE   ABO/RH TYPE AND SCREEN    Narrative:     The following orders were created for panel order ABO/Rh type and screen.  Procedure                               Abnormality         Status                     ---------                               -----------         ------                     Adult Type and Screen[[id number]]                            Edited Result - FINAL        Please view results for these tests on the individual orders.   CELL COUNT WITH DIFFERENTIAL FLUID    Narrative:     The following orders were created for panel order Cell count with differential fluid.  Procedure                               Abnormality         Status                     ---------                               -----------         ------                     Cell Count Body Fluid[[id number]]        Abnormal            Final result               Differential Body Fluid[[id number]]                          Final result                 Please view results for these tests on the individual orders.       RADIOLOGY:  Abd/pelvis CT,  IV  contrast only TRAUMA / AAA   Final Result   IMPRESSION:    1.  Cirrhosis. No  focal hepatic mass.      2.  Moderate diffuse ascites.      3.  Gallstones.      4.  No other significant findings.      XR Chest Port 1 View   Final Result   IMPRESSION: New blunting right costophrenic angle most consistent with a small right pleural effusion. Chest otherwise negative.      IR PICC Placement > 5 Yrs of Age    (Results Pending)   IR PICC Vascular    (Results Pending)       EC2021, sinus tachycardia with rate of 116 bpm; normal intervals; normal conduction; low voltage; poor R wave progression; no ST-T wave elevation consistent with ACS or pericarditis; compared to previous EKG dated 2018, the rate has increased by 28 bpm, poor R wave progression is new    EKG independently reviewed and interpreted by Martina Reis MD      PROCEDURES:  Procedures:    Critical Care     Performed by: Martina Reis   Authorized by: Martina Reis  Total critical care time: 45 minutes  Critical care was necessary to treat or prevent imminent or life-threatening deterioration of the following conditions: shock  Critical care was time spent personally by me on the following activities: development of treatment plan with patient or surrogate, discussions with consultants, examination of patient, evaluation of patient's response to treatment, obtaining history from patient or surrogate, ordering and performing treatments and interventions, ordering and review of laboratory studies, ordering and review of radiographic studies, re-evaluation of patient's condition and monitoring for potential decompensation.  Critical care time was exclusive of separately billable procedures and treating other patients.      [facility] Documentation     CMS Diagnoses:   The patient has signs of Septic Shock  The patient has signs of septic shock as evidenced by:  1. Presence of Sepsis, AND  2. Lactic Acidosis with value greater than or equal to 4 and persistent hypotension defined by the last 2 BP readings being low (SBP  <90 or MAP <65)    Time septic shock diagnosis confirmed = 15:56  08/22/21   as this was the time when Lactate was resulted and the level was greater than or equal to 4    3 Hour Septic Shock Bundle Completion:  1. Initial Lactic Acid Result:   Recent Labs   Lab Test 08/22/21 2148 08/22/21  1556   LACT 3.4* 5.1*     2. Blood Cultures before Antibiotics: Yes  3. Broad Spectrum Antibiotics Administered:  yes       Anti-infectives (From admission through now)    Start     Dose/Rate Route Frequency Ordered Stop    08/22/21 1830  cefTRIAXone (ROCEPHIN) 1 g vial to attach to  mL bag for ADULTS or NS 50 mL bag for PEDS      1 g  over 15-30 Minutes Intravenous ONCE 08/22/21 1822 08/22/21 1938          4. IF patient is in septic shock within 6 hours of time zero, as defined by:  -Initial Hypotension:  2 low BP readings (SBP <90, MAP <65, or decrease > 40 from baseline due to infection) within 3 hrs of each other during the time period of 6hrs before and 6 hrs  after time zero  -Lactate > or = 4  THEN: Fluid volume administered in ED:  Full bolus NOT administered due to 750 ml of IV fluids given    BMI Readings from Last 1 Encounters:   08/22/21 34.38 kg/m      30 mL/kg fluids based on weight: 3,400 mL  30 mL/kg fluids based on IBW (must be >= 60 inches tall): Patient ideal weight not available.    Septic Shock reassessment:  1. Repeat Lactic Acid Level: 3.  2. Vasopressors started for Persistent Hypotension defined by the last 2 BP readings within the ONE HOUR following completion of the 30mL/kg bolus being low (SBP <90 or MAP <65). Patient not given full 30 ml/kg bolus due to tenuous fluid status / volume overload with concern for development of pulmonary edema; patient is DNI    I attest to having performed a repeat sepsis exam and assessment of perfusion at 17:00 and the results demonstrate improved perfusion.         I, Isabel Case, am serving as a scribe to document services personally performed by Martina  MD Chato based on my observation and the provider's statements to me. I, Martina Reis MD attest that Isabel Evie is acting in a scribe capacity, has observed my performance of the services and has documented them in accordance with my direction.    Martina Reis MD  Emergency Medicine  [facility] EMERGENCY DEPARTMENT         Martina Reis MD  08/23/21 0811

## 2021-08-22 NOTE — ED TRIAGE NOTES
Pt with history of cirrhosis.  Pt with large distended abdomen.  Pt with increased n/v/d.  Pt a/o x3 but per neighbor pt with increased failure to thrive.  Pt SBP in the 60's. Md called to room for evaluation

## 2021-08-22 NOTE — PHARMACY-ADMISSION MEDICATION HISTORY
Pharmacy Note - Admission Medication History    Pertinent Provider Information: none     ______________________________________________________________________    Prior To Admission (PTA) med list completed and updated in EMR.       PTA Med List   Medication Sig Last Dose     ibuprofen (ADVIL,MOTRIN) 600 MG tablet [IBUPROFEN (ADVIL,MOTRIN) 600 MG TABLET] Take 1 tablet (600 mg total) by mouth every 6 (six) hours as needed for pain.        Information source(s): Patient  Method of interview communication: in-person    Summary of Changes to PTA Med List  New: none  Discontinued: none  Changed: none    Patient was asked about OTC/herbal products specifically.  PTA med list reflects this.    In the past week, patient estimated taking medication this percent of the time:  N/A    Allergies were reviewed, assessed, and updated with the patient.      Patient does not use any multi-dose medications prior to admission.    The information provided in this note is only as accurate as the sources available at the time of the update(s).    Thank you for the opportunity to participate in the care of this patient.    Jewel Chawla RPH  8/22/2021 4:35 PM

## 2021-08-22 NOTE — PROCEDURES
"PICC Line Insertion Procedure Note  Pt. Name: Wally Eagle  MRN:        [id number]    Procedure: Insertion of a  triple Lumen  5 fr  Bard SOLO (valved) Power PICC, Lot number [id number]    Indications: medications, vasopressors, blood products    Contraindications : none    Procedure Details   Patient identified with 2 identifiers and \"Time Out\" conducted.  .     Central line insertion bundle followed: hand hygeine performed prior to procedure, site cleansed with cholraprep, hat, mask, sterile gloves,sterile gown worn, patient draped with maximum barrier head to toe drape, sterile field maintained.    The vein was assessed and found to be compressible and of adequate size. 2 ml 1% Lidocaine administered sq to the insertion site. A 5 Fr PICC was inserted into the basilic vein of the right arm with ultrasound guidance. 1 attempt(s) required to access vein.   Catheter threaded without difficulty. Good blood return noted.    Modified Seldinger Technique used for insertion.    The 8 sharps that are included in the PICC insertion kit were accounted for and disposed of in the sharps container prior to breakdown of the sterile field.    Catheter secured with Statlock, biopatch and Tegaderm dressing applied.    Findings:  Total catheter length  50 cm, with 5 cm exposed. Mid upper arm circumference is 33.5 cm. Catheter was flushed with 30 cc NS. Patient  tolerated procedure well.    Tip placement verified by [employer] . Tip placement in the SVC.      CLABSI prevention brochure left at bedside.    Patient's primary RN notified PICC is ready for use.    Comments:          @ME2@,BSN  [facility]        "

## 2021-08-22 NOTE — ED NOTES
Bed: JNED-17  Expected date: 8/22/21  Expected time: 3:29 PM  Means of arrival: Ambulance  Comments:  N/V hypotensive,

## 2021-08-23 NOTE — ED NOTES
Spoke with pts brother Paco with pts permission.  Would like an update with pts transfer location when we know.

## 2021-08-23 NOTE — ED NOTES
Pt here with increased weakness, increased abdominal distention and decreased appetite.  Neighbor/friend has noted him to be increasingly unable to care for himself. Pt skin is jaundice, belly distended. Cooperative, alert and oriented. Lives at home by himself.